# Patient Record
Sex: MALE | Race: WHITE | NOT HISPANIC OR LATINO | RURAL
[De-identification: names, ages, dates, MRNs, and addresses within clinical notes are randomized per-mention and may not be internally consistent; named-entity substitution may affect disease eponyms.]

---

## 2023-03-13 ENCOUNTER — OFFICE VISIT (OUTPATIENT)
Dept: RURAL CLINIC 2 | Facility: CLINIC | Age: 38
End: 2023-03-13

## 2023-03-15 ENCOUNTER — OFFICE VISIT (OUTPATIENT)
Dept: RURAL CLINIC 2 | Facility: CLINIC | Age: 38
End: 2023-03-15

## 2023-03-15 RX ORDER — ESOMEPRAZOLE MAGNESIUM 20 MG/1
1 CAPSULE CAPSULE, DELAYED RELEASE ORAL ONCE A DAY
Status: ACTIVE | COMMUNITY

## 2023-05-02 ENCOUNTER — OFFICE VISIT (OUTPATIENT)
Dept: RURAL CLINIC 2 | Facility: CLINIC | Age: 38
End: 2023-05-02
Payer: COMMERCIAL

## 2023-05-02 ENCOUNTER — WEB ENCOUNTER (OUTPATIENT)
Dept: RURAL CLINIC 2 | Facility: CLINIC | Age: 38
End: 2023-05-02

## 2023-05-02 ENCOUNTER — DASHBOARD ENCOUNTERS (OUTPATIENT)
Age: 38
End: 2023-05-02

## 2023-05-02 ENCOUNTER — LAB OUTSIDE AN ENCOUNTER (OUTPATIENT)
Dept: RURAL CLINIC 2 | Facility: CLINIC | Age: 38
End: 2023-05-02

## 2023-05-02 VITALS
TEMPERATURE: 98.2 F | SYSTOLIC BLOOD PRESSURE: 152 MMHG | DIASTOLIC BLOOD PRESSURE: 81 MMHG | WEIGHT: 216 LBS | HEART RATE: 67 BPM | HEIGHT: 74 IN | BODY MASS INDEX: 27.72 KG/M2

## 2023-05-02 DIAGNOSIS — R14.2 BELCHING: ICD-10-CM

## 2023-05-02 DIAGNOSIS — K21.9 GASTROESOPHAGEAL REFLUX DISEASE, UNSPECIFIED WHETHER ESOPHAGITIS PRESENT: ICD-10-CM

## 2023-05-02 DIAGNOSIS — R10.11 RIGHT UPPER QUADRANT ABDOMINAL PAIN: ICD-10-CM

## 2023-05-02 DIAGNOSIS — K76.0 FATTY LIVER DISEASE, NONALCOHOLIC: ICD-10-CM

## 2023-05-02 PROBLEM — 235595009: Status: ACTIVE | Noted: 2023-05-02

## 2023-05-02 PROBLEM — 301717006: Status: ACTIVE | Noted: 2023-05-02

## 2023-05-02 PROBLEM — 271834000: Status: ACTIVE | Noted: 2023-05-02

## 2023-05-02 PROBLEM — 162031009: Status: ACTIVE | Noted: 2023-05-02

## 2023-05-02 PROBLEM — 1231824009: Status: ACTIVE | Noted: 2023-05-02

## 2023-05-02 PROCEDURE — 99203 OFFICE O/P NEW LOW 30 MIN: CPT | Performed by: INTERNAL MEDICINE

## 2023-05-02 PROCEDURE — 99243 OFF/OP CNSLTJ NEW/EST LOW 30: CPT | Performed by: INTERNAL MEDICINE

## 2023-05-02 RX ORDER — ESOMEPRAZOLE MAGNESIUM 20 MG/1
1 CAPSULE CAPSULE, DELAYED RELEASE ORAL ONCE A DAY
Status: ACTIVE | COMMUNITY

## 2023-05-02 NOTE — HPI-ZZZTODAY'S VISIT
The patient is a 38-year-old gentleman who presents on referral from Dr. Bailee Pham for right upper quadrant postprandial abdominal pain, dyspepsia and history of fatty liver disease.  A copy of this document to be sent to the referring provider.  The patient completed right upper quadrant abdominal ultrasound, HIDA scan and lab work to evaluate liver enzymes and pancreatic enzymes and the work-up was completely normal.  He reports intermittent dyspepsia and belching despite taking Nexium 40 mg daily.  Over the past couple of months he has changed his diet with improvement in his symptoms.  He underwent an EGD in 2014 completed by Dr. Lafleur.  Records available and reviewed noting normal exam. In  2022 imaging confirmed fatty liver disease and lab work revealed mild transaminitis.  The patient has since lost 30 pounds over the past year and a repeat abdominal ultrasound shows hyperechoic liver, suggestive of steatosis and repeat liver enzymes are within normal limits.  The patient reports rare alcohol use of 1-2 drinks per month.

## 2023-05-26 ENCOUNTER — OFFICE VISIT (OUTPATIENT)
Dept: RURAL MEDICAL CENTER 4 | Facility: MEDICAL CENTER | Age: 38
End: 2023-05-26
Payer: COMMERCIAL

## 2023-05-26 DIAGNOSIS — R11.2 ACUTE NAUSEA WITH NONBILIOUS VOMITING: ICD-10-CM

## 2023-05-26 DIAGNOSIS — R10.11 ABDOMINAL BURNING SENSATION IN RIGHT UPPER QUADRANT: ICD-10-CM

## 2023-05-26 DIAGNOSIS — K21.9 ACID REFLUX: ICD-10-CM

## 2023-05-26 DIAGNOSIS — K29.60 ADENOPAPILLOMATOSIS GASTRICA: ICD-10-CM

## 2023-05-26 PROCEDURE — 43239 EGD BIOPSY SINGLE/MULTIPLE: CPT | Performed by: INTERNAL MEDICINE

## 2023-05-26 RX ORDER — ESOMEPRAZOLE MAGNESIUM 20 MG/1
1 CAPSULE CAPSULE, DELAYED RELEASE ORAL ONCE A DAY
Status: ACTIVE | COMMUNITY